# Patient Record
Sex: FEMALE | Race: WHITE | NOT HISPANIC OR LATINO
[De-identification: names, ages, dates, MRNs, and addresses within clinical notes are randomized per-mention and may not be internally consistent; named-entity substitution may affect disease eponyms.]

---

## 2018-07-16 ENCOUNTER — RESULT REVIEW (OUTPATIENT)
Age: 23
End: 2018-07-16

## 2019-08-05 ENCOUNTER — RESULT REVIEW (OUTPATIENT)
Age: 24
End: 2019-08-05

## 2019-08-15 PROBLEM — Z00.00 ENCOUNTER FOR PREVENTIVE HEALTH EXAMINATION: Status: ACTIVE | Noted: 2019-08-15

## 2019-08-19 ENCOUNTER — APPOINTMENT (OUTPATIENT)
Dept: SURGERY | Facility: CLINIC | Age: 24
End: 2019-08-19
Payer: COMMERCIAL

## 2019-08-19 DIAGNOSIS — Z78.9 OTHER SPECIFIED HEALTH STATUS: ICD-10-CM

## 2019-08-19 DIAGNOSIS — Q89.2 CONGENITAL MALFORMATIONS OF OTHER ENDOCRINE GLANDS: ICD-10-CM

## 2019-08-19 PROCEDURE — 99203 OFFICE O/P NEW LOW 30 MIN: CPT

## 2019-08-20 PROBLEM — Z78.9 NO PERTINENT PAST MEDICAL HISTORY: Status: RESOLVED | Noted: 2019-08-20 | Resolved: 2019-08-20

## 2019-08-20 NOTE — ASSESSMENT
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 I have recommended close clinical followup with a repeat ultrasound. If cyst enlarges or becomes inflamed, surgical excision would be considered. If cyst shows irregularity or mass within the wall, biopsy would be indicated. RTO6 months. The patient was instructed to contact my office if symptoms develop or mass enlarges.

## 2019-08-20 NOTE — REASON FOR VISIT
[Initial Consultation] : an initial consultation for [FreeTextEntry2] : anterior neck cyst [Other: _____] : [unfilled]

## 2019-08-20 NOTE — PHYSICAL EXAM
[de-identified] : no cervical or supraclavicular adenopathy, trachea midline, thyroid without enlargement or palpable mass, soft fullness anterior neck submental, elevates with swallowing c/w TGDC, no erythema or tenderness.  [Normal] : orientation to person, place, and time: normal

## 2019-08-20 NOTE — CONSULT LETTER
[Dear  ___] : Dear  [unfilled], [Consult Letter:] : I had the pleasure of evaluating your patient, [unfilled]. [Please see my note below.] : Please see my note below. [FreeTextEntry2] : Dr. Kiarra Chirinos [Consult Closing:] : Thank you very much for allowing me to participate in the care of this patient.  If you have any questions, please do not hesitate to contact me. [FreeTextEntry3] : Sincerely yours,\par \par Moni Bernstein MD, FACS\par Assistant Professor of Surgery\par [facility]

## 2019-08-20 NOTE — HISTORY OF PRESENT ILLNESS
[de-identified] : Patient referred by Dr. Chirinos for evaluation of anterior neck cyst. During recent examination by gynecologist, patient's thyroid was felt to be enlarged. Thyroid ultrasound August 6, 2019: Right lobe 4.6 x 1.2 x 1.9 CM. Left lobe 3.9 x 0.9 x 1.4 CM, no nodules noted, no abnormal lymph nodes. 5 x 10 x 2 mm cystic structure in submental region differential includes thyroglossal duct cyst versus ranula. Patient denies fever, cough, chills, dysphagia, change in voice or radiation exposure. Patient had a sinus infection 6 weeks ago. Reports intermittent right submandibular swelling which resolved spontaneously.

## 2020-02-24 ENCOUNTER — APPOINTMENT (OUTPATIENT)
Dept: SURGERY | Facility: CLINIC | Age: 25
End: 2020-02-24

## 2020-04-26 ENCOUNTER — MESSAGE (OUTPATIENT)
Age: 25
End: 2020-04-26

## 2020-05-26 ENCOUNTER — APPOINTMENT (OUTPATIENT)
Dept: DISASTER EMERGENCY | Facility: CLINIC | Age: 25
End: 2020-05-26

## 2020-05-26 LAB
SARS-COV-2 IGG SERPL IA-ACNC: <0.1 INDEX
SARS-COV-2 IGG SERPL QL IA: NEGATIVE

## 2020-11-12 ENCOUNTER — RESULT REVIEW (OUTPATIENT)
Age: 25
End: 2020-11-12

## 2021-07-13 ENCOUNTER — APPOINTMENT (OUTPATIENT)
Dept: OBGYN | Facility: CLINIC | Age: 26
End: 2021-07-13
Payer: COMMERCIAL

## 2021-07-13 ENCOUNTER — ASOB RESULT (OUTPATIENT)
Age: 26
End: 2021-07-13

## 2021-07-13 VITALS — DIASTOLIC BLOOD PRESSURE: 70 MMHG | SYSTOLIC BLOOD PRESSURE: 112 MMHG

## 2021-07-13 DIAGNOSIS — N84.2 POLYP OF VAGINA: ICD-10-CM

## 2021-07-13 DIAGNOSIS — K62.5 HEMORRHAGE OF ANUS AND RECTUM: ICD-10-CM

## 2021-07-13 DIAGNOSIS — N83.209 UNSPECIFIED OVARIAN CYST, UNSPECIFIED SIDE: ICD-10-CM

## 2021-07-13 DIAGNOSIS — N93.0 POSTCOITAL AND CONTACT BLEEDING: ICD-10-CM

## 2021-07-13 PROCEDURE — 76856 US EXAM PELVIC COMPLETE: CPT | Mod: 59

## 2021-07-13 PROCEDURE — 99072 ADDL SUPL MATRL&STAF TM PHE: CPT

## 2021-07-13 PROCEDURE — 76830 TRANSVAGINAL US NON-OB: CPT

## 2021-07-13 PROCEDURE — 87070 CULTURE OTHR SPECIMN AEROBIC: CPT

## 2021-07-13 PROCEDURE — 82270 OCCULT BLOOD FECES: CPT

## 2021-07-13 PROCEDURE — 99214 OFFICE O/P EST MOD 30 MIN: CPT | Mod: 25

## 2021-07-15 LAB
C TRACH RRNA SPEC QL NAA+PROBE: NOT DETECTED
N GONORRHOEA RRNA SPEC QL NAA+PROBE: NOT DETECTED
SOURCE AMPLIFICATION: NORMAL

## 2021-11-04 RX ORDER — NORETHINDRONE ACETATE AND ETHINYL ESTRADIOL TABLETS AND FERROUS FUMARATE TABLETS 1MG-20(24)
1-20 KIT ORAL
Qty: 1 | Refills: 0 | Status: ACTIVE | COMMUNITY
Start: 2021-03-02 | End: 1900-01-01

## 2021-11-23 ENCOUNTER — APPOINTMENT (OUTPATIENT)
Dept: OBGYN | Facility: CLINIC | Age: 26
End: 2021-11-23
Payer: COMMERCIAL

## 2021-11-23 ENCOUNTER — ASOB RESULT (OUTPATIENT)
Age: 26
End: 2021-11-23

## 2021-11-23 VITALS
BODY MASS INDEX: 18.83 KG/M2 | SYSTOLIC BLOOD PRESSURE: 110 MMHG | HEIGHT: 65 IN | WEIGHT: 113 LBS | DIASTOLIC BLOOD PRESSURE: 70 MMHG

## 2021-11-23 DIAGNOSIS — Z01.419 ENCOUNTER FOR GYNECOLOGICAL EXAMINATION (GENERAL) (ROUTINE) W/OUT ABNORMAL FINDINGS: ICD-10-CM

## 2021-11-23 PROCEDURE — 76830 TRANSVAGINAL US NON-OB: CPT

## 2021-11-23 PROCEDURE — 99395 PREV VISIT EST AGE 18-39: CPT

## 2021-11-24 LAB
C TRACH RRNA SPEC QL NAA+PROBE: NOT DETECTED
N GONORRHOEA RRNA SPEC QL NAA+PROBE: NOT DETECTED
SOURCE TP AMPLIFICATION: NORMAL

## 2021-11-29 LAB — CYTOLOGY CVX/VAG DOC THIN PREP: NORMAL

## 2021-11-30 ENCOUNTER — APPOINTMENT (OUTPATIENT)
Dept: ULTRASOUND IMAGING | Facility: CLINIC | Age: 26
End: 2021-11-30
Payer: COMMERCIAL

## 2021-11-30 PROCEDURE — 76856 US EXAM PELVIC COMPLETE: CPT

## 2021-11-30 PROCEDURE — 76830 TRANSVAGINAL US NON-OB: CPT

## 2022-02-08 ENCOUNTER — TRANSCRIPTION ENCOUNTER (OUTPATIENT)
Age: 27
End: 2022-02-08

## 2022-08-19 ENCOUNTER — OFFICE (OUTPATIENT)
Dept: URBAN - METROPOLITAN AREA CLINIC 27 | Facility: CLINIC | Age: 27
Setting detail: OPHTHALMOLOGY
End: 2022-08-19

## 2022-08-19 DIAGNOSIS — H52.13: ICD-10-CM

## 2022-08-19 PROCEDURE — SCREF LASIK EVAL: Performed by: OPHTHALMOLOGY

## 2022-08-19 ASSESSMENT — REFRACTION_AUTOREFRACTION
OS_CYLINDER: +0.50
OD_SPHERE: -1.50
OS_AXIS: 006
OD_CYLINDER: +0.25
OS_SPHERE: -1.00
OS_CYLINDER: +0.50
OD_SPHERE: -1.25
OS_AXIS: 009
OD_CYLINDER: +0.25
OD_AXIS: 160
OD_AXIS: 162
OS_SPHERE: -0.75

## 2022-08-19 ASSESSMENT — AXIALLENGTH_DERIVED
OS_AL: 23.9852
OS_AL: 23.9852
OD_AL: 24.2851
OS_AL: 23.9349
OD_AL: 24.1824
OS_AL: 23.8351

## 2022-08-19 ASSESSMENT — VISUAL ACUITY
OS_BCVA: 20/20
OD_BCVA: 20/20

## 2022-08-19 ASSESSMENT — REFRACTION_CURRENTRX
OS_OVR_VA: 20/
OS_SPHERE: -0.75
OD_OVR_VA: 20/
OD_CYLINDER: SPH
OS_CYLINDER: SPH
OD_SPHERE: -1.50

## 2022-08-19 ASSESSMENT — SPHEQUIV_DERIVED
OS_SPHEQUIV: -0.875
OS_SPHEQUIV: -0.875
OD_SPHEQUIV: -1.375
OS_SPHEQUIV: -0.5
OS_SPHEQUIV: -0.75
OD_SPHEQUIV: -1.125

## 2022-08-19 ASSESSMENT — REFRACTION_MANIFEST
OS_SPHERE: -1.00
OS_VA1: 20/15
OS_CYLINDER: +0.25
OS_SPHERE: -1.00
OD_SPHERE: -1.50
OD_VA1: 20/15
OS_VA1: 20/15
OD_CYLINDER: SPH
OS_AXIS: 010
OS_CYLINDER: +0.25
OD_SPHERE: -1.25
OD_CYLINDER: SPH
OS_AXIS: 020
OD_VA1: 20/15

## 2022-08-19 ASSESSMENT — TONOMETRY
OD_IOP_MMHG: 14
OS_IOP_MMHG: 13

## 2022-08-19 ASSESSMENT — CONFRONTATIONAL VISUAL FIELD TEST (CVF)
OD_FINDINGS: FULL
OS_FINDINGS: FULL

## 2022-08-19 ASSESSMENT — KERATOMETRY
OS_AXISANGLE_DEGREES: 129
OS_K1POWER_DIOPTERS: 43.25
OS_K2POWER_DIOPTERS: 43.50
OD_AXISANGLE_DEGREES: 038
OD_K1POWER_DIOPTERS: 43.00
OD_K2POWER_DIOPTERS: 43.25

## 2022-10-22 ENCOUNTER — RX RENEWAL (OUTPATIENT)
Age: 27
End: 2022-10-22

## 2022-10-26 ENCOUNTER — RX RENEWAL (OUTPATIENT)
Age: 27
End: 2022-10-26

## 2022-12-15 ENCOUNTER — APPOINTMENT (OUTPATIENT)
Dept: OBGYN | Facility: CLINIC | Age: 27
End: 2022-12-15

## 2022-12-15 ENCOUNTER — ASOB RESULT (OUTPATIENT)
Age: 27
End: 2022-12-15

## 2022-12-15 VITALS
WEIGHT: 112 LBS | BODY MASS INDEX: 18.66 KG/M2 | DIASTOLIC BLOOD PRESSURE: 79 MMHG | HEIGHT: 65 IN | SYSTOLIC BLOOD PRESSURE: 116 MMHG

## 2022-12-15 PROCEDURE — 76830 TRANSVAGINAL US NON-OB: CPT

## 2022-12-15 PROCEDURE — 99395 PREV VISIT EST AGE 18-39: CPT

## 2022-12-29 LAB — CYTOLOGY CVX/VAG DOC THIN PREP: NORMAL

## 2023-01-19 ENCOUNTER — ASOB RESULT (OUTPATIENT)
Age: 28
End: 2023-01-19

## 2023-01-19 ENCOUNTER — APPOINTMENT (OUTPATIENT)
Dept: OBGYN | Facility: CLINIC | Age: 28
End: 2023-01-19
Payer: COMMERCIAL

## 2023-01-19 PROCEDURE — 76830 TRANSVAGINAL US NON-OB: CPT

## 2023-01-22 ENCOUNTER — RX RENEWAL (OUTPATIENT)
Age: 28
End: 2023-01-22

## 2023-04-17 ENCOUNTER — RX RENEWAL (OUTPATIENT)
Age: 28
End: 2023-04-17

## 2023-07-08 ENCOUNTER — RX RENEWAL (OUTPATIENT)
Age: 28
End: 2023-07-08

## 2023-11-06 ENCOUNTER — NON-APPOINTMENT (OUTPATIENT)
Age: 28
End: 2023-11-06

## 2023-12-26 ENCOUNTER — TRANSCRIPTION ENCOUNTER (OUTPATIENT)
Age: 28
End: 2023-12-26

## 2023-12-26 ENCOUNTER — APPOINTMENT (OUTPATIENT)
Dept: OBGYN | Facility: CLINIC | Age: 28
End: 2023-12-26
Payer: COMMERCIAL

## 2023-12-26 VITALS
SYSTOLIC BLOOD PRESSURE: 119 MMHG | HEIGHT: 65 IN | DIASTOLIC BLOOD PRESSURE: 79 MMHG | BODY MASS INDEX: 18.33 KG/M2 | WEIGHT: 110 LBS

## 2023-12-26 DIAGNOSIS — Q52.4 OTHER CONGENITAL MALFORMATIONS OF VAGINA: ICD-10-CM

## 2023-12-26 DIAGNOSIS — Z30.41 ENCOUNTER FOR SURVEILLANCE OF CONTRACEPTIVE PILLS: ICD-10-CM

## 2023-12-26 DIAGNOSIS — N83.201 UNSPECIFIED OVARIAN CYST, RIGHT SIDE: ICD-10-CM

## 2023-12-26 DIAGNOSIS — Z01.411 ENCOUNTER FOR GYNECOLOGICAL EXAMINATION (GENERAL) (ROUTINE) WITH ABNORMAL FINDINGS: ICD-10-CM

## 2023-12-26 PROCEDURE — 99395 PREV VISIT EST AGE 18-39: CPT

## 2023-12-27 LAB
C TRACH RRNA SPEC QL NAA+PROBE: NOT DETECTED
HPV HIGH+LOW RISK DNA PNL CVX: NOT DETECTED
N GONORRHOEA RRNA SPEC QL NAA+PROBE: NOT DETECTED
SOURCE TP AMPLIFICATION: NORMAL

## 2023-12-28 DIAGNOSIS — N83.209 UNSPECIFIED OVARIAN CYST, UNSPECIFIED SIDE: ICD-10-CM

## 2023-12-29 LAB — CYTOLOGY CVX/VAG DOC THIN PREP: NORMAL

## 2024-01-20 RX ORDER — NORETHINDRONE ACETATE AND ETHINYL ESTRADIOL AND FERROUS FUMARATE 1MG-20(24)
1-20 KIT ORAL
Qty: 84 | Refills: 3 | Status: ACTIVE | COMMUNITY
Start: 2021-11-23 | End: 1900-01-01

## 2024-02-02 ENCOUNTER — ASOB RESULT (OUTPATIENT)
Age: 29
End: 2024-02-02

## 2024-02-02 ENCOUNTER — APPOINTMENT (OUTPATIENT)
Dept: OBGYN | Facility: CLINIC | Age: 29
End: 2024-02-02
Payer: COMMERCIAL

## 2024-02-02 ENCOUNTER — APPOINTMENT (OUTPATIENT)
Dept: OBGYN | Facility: CLINIC | Age: 29
End: 2024-02-02

## 2024-02-02 PROCEDURE — 76830 TRANSVAGINAL US NON-OB: CPT

## 2024-02-20 ENCOUNTER — NON-APPOINTMENT (OUTPATIENT)
Age: 29
End: 2024-02-20

## 2024-03-29 ENCOUNTER — ASOB RESULT (OUTPATIENT)
Age: 29
End: 2024-03-29

## 2024-03-29 ENCOUNTER — APPOINTMENT (OUTPATIENT)
Dept: OBGYN | Facility: CLINIC | Age: 29
End: 2024-03-29
Payer: COMMERCIAL

## 2024-03-29 PROCEDURE — 76830 TRANSVAGINAL US NON-OB: CPT

## 2024-04-02 ENCOUNTER — NON-APPOINTMENT (OUTPATIENT)
Age: 29
End: 2024-04-02

## 2024-05-17 ENCOUNTER — ASOB RESULT (OUTPATIENT)
Age: 29
End: 2024-05-17

## 2024-05-17 ENCOUNTER — APPOINTMENT (OUTPATIENT)
Dept: OBGYN | Facility: CLINIC | Age: 29
End: 2024-05-17
Payer: COMMERCIAL

## 2024-05-17 PROCEDURE — 76830 TRANSVAGINAL US NON-OB: CPT

## 2024-05-29 ENCOUNTER — NON-APPOINTMENT (OUTPATIENT)
Age: 29
End: 2024-05-29

## 2024-08-30 ENCOUNTER — RX RENEWAL (OUTPATIENT)
Age: 29
End: 2024-08-30

## 2024-10-24 ENCOUNTER — NON-APPOINTMENT (OUTPATIENT)
Age: 29
End: 2024-10-24

## 2025-02-07 ENCOUNTER — APPOINTMENT (OUTPATIENT)
Dept: OBGYN | Facility: CLINIC | Age: 30
End: 2025-02-07

## 2025-02-21 ENCOUNTER — NON-APPOINTMENT (OUTPATIENT)
Age: 30
End: 2025-02-21

## 2025-02-21 ENCOUNTER — APPOINTMENT (OUTPATIENT)
Dept: OBGYN | Facility: CLINIC | Age: 30
End: 2025-02-21

## 2025-02-21 VITALS
HEIGHT: 65 IN | WEIGHT: 112 LBS | DIASTOLIC BLOOD PRESSURE: 80 MMHG | BODY MASS INDEX: 18.66 KG/M2 | SYSTOLIC BLOOD PRESSURE: 121 MMHG

## 2025-02-21 DIAGNOSIS — Z01.411 ENCOUNTER FOR GYNECOLOGICAL EXAMINATION (GENERAL) (ROUTINE) WITH ABNORMAL FINDINGS: ICD-10-CM

## 2025-02-21 DIAGNOSIS — Z13.31 ENCOUNTER FOR SCREENING FOR DEPRESSION: ICD-10-CM

## 2025-02-21 DIAGNOSIS — N83.209 UNSPECIFIED OVARIAN CYST, UNSPECIFIED SIDE: ICD-10-CM

## 2025-02-21 DIAGNOSIS — Z30.41 ENCOUNTER FOR SURVEILLANCE OF CONTRACEPTIVE PILLS: ICD-10-CM

## 2025-02-21 PROCEDURE — 99395 PREV VISIT EST AGE 18-39: CPT

## 2025-02-21 PROCEDURE — 99459 PELVIC EXAMINATION: CPT

## 2025-02-25 LAB — CYTOLOGY CVX/VAG DOC THIN PREP: NORMAL

## 2025-03-19 ENCOUNTER — ASOB RESULT (OUTPATIENT)
Age: 30
End: 2025-03-19

## 2025-03-19 ENCOUNTER — APPOINTMENT (OUTPATIENT)
Dept: OBGYN | Facility: CLINIC | Age: 30
End: 2025-03-19
Payer: COMMERCIAL

## 2025-03-19 PROCEDURE — 76830 TRANSVAGINAL US NON-OB: CPT

## 2025-03-26 ENCOUNTER — NON-APPOINTMENT (OUTPATIENT)
Age: 30
End: 2025-03-26

## 2025-03-26 ENCOUNTER — TRANSCRIPTION ENCOUNTER (OUTPATIENT)
Age: 30
End: 2025-03-26

## 2025-03-26 DIAGNOSIS — Z00.00 ENCOUNTER FOR GENERAL ADULT MEDICAL EXAMINATION W/OUT ABNORMAL FINDINGS: ICD-10-CM

## 2025-03-26 DIAGNOSIS — Q89.2 CONGENITAL MALFORMATIONS OF OTHER ENDOCRINE GLANDS: ICD-10-CM

## 2025-03-27 ENCOUNTER — NON-APPOINTMENT (OUTPATIENT)
Age: 30
End: 2025-03-27

## 2025-03-28 ENCOUNTER — TRANSCRIPTION ENCOUNTER (OUTPATIENT)
Age: 30
End: 2025-03-28

## 2025-04-09 ENCOUNTER — APPOINTMENT (OUTPATIENT)
Dept: INTERNAL MEDICINE | Facility: CLINIC | Age: 30
End: 2025-04-09
Payer: COMMERCIAL

## 2025-04-09 ENCOUNTER — LABORATORY RESULT (OUTPATIENT)
Age: 30
End: 2025-04-09

## 2025-04-09 ENCOUNTER — NON-APPOINTMENT (OUTPATIENT)
Age: 30
End: 2025-04-09

## 2025-04-09 VITALS
WEIGHT: 117 LBS | HEIGHT: 65 IN | BODY MASS INDEX: 19.49 KG/M2 | DIASTOLIC BLOOD PRESSURE: 78 MMHG | SYSTOLIC BLOOD PRESSURE: 120 MMHG

## 2025-04-09 DIAGNOSIS — Z87.798 PERSONAL HISTORY OF OTHER (CORRECTED) CONGENITAL MALFORMATIONS: ICD-10-CM

## 2025-04-09 DIAGNOSIS — Z00.00 ENCOUNTER FOR GENERAL ADULT MEDICAL EXAMINATION W/OUT ABNORMAL FINDINGS: ICD-10-CM

## 2025-04-09 DIAGNOSIS — K62.5 HEMORRHAGE OF ANUS AND RECTUM: ICD-10-CM

## 2025-04-09 PROCEDURE — 93000 ELECTROCARDIOGRAM COMPLETE: CPT | Mod: 59

## 2025-04-09 PROCEDURE — G0444 DEPRESSION SCREEN ANNUAL: CPT | Mod: 59

## 2025-04-09 PROCEDURE — 99385 PREV VISIT NEW AGE 18-39: CPT

## 2025-04-11 ENCOUNTER — TRANSCRIPTION ENCOUNTER (OUTPATIENT)
Age: 30
End: 2025-04-11

## 2025-04-21 ENCOUNTER — NON-APPOINTMENT (OUTPATIENT)
Age: 30
End: 2025-04-21

## 2025-04-21 ENCOUNTER — APPOINTMENT (OUTPATIENT)
Dept: INTERNAL MEDICINE | Facility: CLINIC | Age: 30
End: 2025-04-21

## 2025-06-09 ENCOUNTER — APPOINTMENT (OUTPATIENT)
Dept: OBGYN | Facility: CLINIC | Age: 30
End: 2025-06-09
Payer: COMMERCIAL

## 2025-06-09 ENCOUNTER — ASOB RESULT (OUTPATIENT)
Age: 30
End: 2025-06-09

## 2025-06-09 PROCEDURE — 76830 TRANSVAGINAL US NON-OB: CPT

## 2025-07-23 ENCOUNTER — NON-APPOINTMENT (OUTPATIENT)
Age: 30
End: 2025-07-23